# Patient Record
Sex: MALE | Race: BLACK OR AFRICAN AMERICAN | Employment: FULL TIME | URBAN - METROPOLITAN AREA
[De-identification: names, ages, dates, MRNs, and addresses within clinical notes are randomized per-mention and may not be internally consistent; named-entity substitution may affect disease eponyms.]

---

## 2020-12-10 ENCOUNTER — APPOINTMENT (OUTPATIENT)
Dept: GENERAL RADIOLOGY | Age: 70
End: 2020-12-10
Attending: EMERGENCY MEDICINE
Payer: OTHER MISCELLANEOUS

## 2020-12-10 ENCOUNTER — HOSPITAL ENCOUNTER (EMERGENCY)
Age: 70
Discharge: HOME | End: 2020-12-10
Attending: EMERGENCY MEDICINE
Payer: OTHER MISCELLANEOUS

## 2020-12-10 VITALS
SYSTOLIC BLOOD PRESSURE: 160 MMHG | OXYGEN SATURATION: 97 % | TEMPERATURE: 98.5 F | BODY MASS INDEX: 27 KG/M2 | RESPIRATION RATE: 16 BRPM | DIASTOLIC BLOOD PRESSURE: 72 MMHG | HEIGHT: 67 IN | WEIGHT: 172 LBS | HEART RATE: 64 BPM

## 2020-12-10 DIAGNOSIS — S82.891A CLOSED FRACTURE OF RIGHT ANKLE, INITIAL ENCOUNTER: Primary | ICD-10-CM

## 2020-12-10 PROCEDURE — 99284 EMERGENCY DEPT VISIT MOD MDM: CPT

## 2020-12-10 PROCEDURE — 75810000053 HC SPLINT APPLICATION

## 2020-12-10 PROCEDURE — 74011250637 HC RX REV CODE- 250/637: Performed by: PHYSICIAN ASSISTANT

## 2020-12-10 PROCEDURE — 73610 X-RAY EXAM OF ANKLE: CPT

## 2020-12-10 RX ORDER — HYDROCODONE BITARTRATE AND ACETAMINOPHEN 5; 325 MG/1; MG/1
1 TABLET ORAL
Qty: 15 TAB | Refills: 0 | Status: SHIPPED | OUTPATIENT
Start: 2020-12-10 | End: 2020-12-15

## 2020-12-10 RX ORDER — HYDROCODONE BITARTRATE AND ACETAMINOPHEN 5; 325 MG/1; MG/1
1 TABLET ORAL
Status: COMPLETED | OUTPATIENT
Start: 2020-12-10 | End: 2020-12-10

## 2020-12-10 RX ADMIN — HYDROCODONE BITARTRATE AND ACETAMINOPHEN 1 TABLET: 5; 325 TABLET ORAL at 12:27

## 2020-12-10 NOTE — ED TRIAGE NOTES
Pt to ER with EMS after he tripped and fell onto right ankle at work today. Swelling noted to ankle. Pt has a mask for triage.

## 2020-12-10 NOTE — LETTER
[id number] [address] EMERGENCY DEPT 
[address] [facility] [id number] [phone] Work/School Note Date: 12/10/2020 To Whom It May concern: 
 
Raffaele Client was seen and treated today in the emergency room by the following provider(s): 
Attending Provider: Sam Burton MD 
Physician Assistant: ERNESTO Lange. Raffaele Client may return to work when cleared by orthopedics. Sincerely, ERNESTO Mendes

## 2020-12-10 NOTE — DISCHARGE INSTRUCTIONS
Patient Education        Broken Ankle: Care Instructions  Your Care Instructions     An ankle may break (fracture) during sports, a fall, or other accidents. Fractures can range from a small, hairline crack, to a bone or bones broken into two or more pieces. Your treatment depends on how bad the break is. Your doctor may have put your ankle in a splint or cast to allow it to heal or to keep it stable until you see another doctor. It may take weeks or months for your ankle to heal. You can help your ankle heal with some care at home. You heal best when you take good care of yourself. Eat a variety of healthy foods, and don't smoke. You may have had a sedative to help you relax. You may be unsteady after having sedation. It can take a few hours for the medicine's effects to wear off. Common side effects of sedation include nausea, vomiting, and feeling sleepy or tired. The doctor has checked you carefully, but problems can develop later. If you notice any problems or new symptoms,  get medical treatment right away. Follow-up care is a key part of your treatment and safety. Be sure to make and go to all appointments, and call your doctor if you are having problems. It's also a good idea to know your test results and keep a list of the medicines you take. How can you care for yourself at home? · If the doctor gave you a sedative:  ? For 24 hours, don't do anything that requires attention to detail, such as going to work, making important decisions, or signing any legal documents. It takes time for the medicine's effects to completely wear off.  ? For your safety, do not drive or operate any machinery that could be dangerous. Wait until the medicine wears off and you can think clearly and react easily. · Put ice or a cold pack on your ankle for 10 to 20 minutes at a time. Try to do this every 1 to 2 hours for the next 3 days (when you are awake). Put a thin cloth between the ice and your cast or splint.  Keep your cast or splint dry. · Follow the cast care instructions your doctor gives you. If you have a splint, do not take it off unless your doctor tells you to. · Be safe with medicines. Take pain medicines exactly as directed. ? If the doctor gave you a prescription medicine for pain, take it as prescribed. ? If you are not taking a prescription pain medicine, ask your doctor if you can take an over-the-counter medicine. · Prop up your leg on pillows in the first few days after the injury. Keep the ankle higher than the level of your heart. This will help reduce swelling. · Do not put weight on your ankle unless your doctor tells you to. Use crutches to walk. · Follow instructions for exercises to keep your leg strong. · Wiggle your toes often to reduce swelling and stiffness. When should you call for help? Call 911 anytime you think you may need emergency care. For example, call if:    · You have chest pain, are short of breath, or you cough up blood.     · You are very sleepy and you have trouble waking up. Call your doctor now or seek immediate medical care if:    · You have new or worse nausea or vomiting.     · You have new or worse pain.     · Your foot is cool or pale or changes color.     · You have tingling, weakness, or numbness in your toes.     · Your cast or splint feels too tight.     · You have signs of a blood clot in your leg (called a deep vein thrombosis), such as:  ? Pain in your calf, back of the knee, thigh, or groin. ? Redness or swelling in your leg. Watch closely for changes in your health, and be sure to contact your doctor if:    · You have a problem with your splint or cast.     · You do not get better as expected. Where can you learn more? Go to http://www.gray.com/  Enter P763 in the search box to learn more about \"Broken Ankle: Care Instructions. \"  Current as of: March 2, 2020               Content Version: 12.6  © 7547-0735 Healthwise, Incorporated. Care instructions adapted under license by [employer] (which disclaims liability or warranty for this information). If you have questions about a medical condition or this instruction, always ask your healthcare professional. [address] any warranty or liability for your use of this information.

## 2020-12-10 NOTE — ED PROVIDER NOTES
States he twisted his right ankle at work he has pain to the right ankle inability to bear weight. Denies any other injury    The history is provided by the patient. Ankle Pain    This is a new problem. The current episode started less than 1 hour ago. The problem occurs constantly. The problem has not changed since onset. The pain is present in the right ankle. The quality of the pain is described as aching. The pain is at a severity of 8/10. The pain is moderate. Associated symptoms include stiffness. The symptoms are aggravated by movement and palpation. He has tried nothing for the symptoms. The treatment provided no relief. There has been a history of trauma. History reviewed. No pertinent past medical history. Past Surgical History:   Procedure Laterality Date    HX CHOLECYSTECTOMY           History reviewed. No pertinent family history.     Social History     Socioeconomic History    Marital status:      Spouse name: Not on file    Number of children: Not on file    Years of education: Not on file    Highest education level: Not on file   Occupational History    Not on file   Social Needs    Financial resource strain: Not on file    Food insecurity     Worry: Not on file     Inability: Not on file    Transportation needs     Medical: Not on file     Non-medical: Not on file   Tobacco Use    Smoking status: Never Smoker   Substance and Sexual Activity    Alcohol use: Not Currently    Drug use: Not Currently    Sexual activity: Not on file   Lifestyle    Physical activity     Days per week: Not on file     Minutes per session: Not on file    Stress: Not on file   Relationships    Social connections     Talks on phone: Not on file     Gets together: Not on file     Attends Zoroastrianism service: Not on file     Active member of club or organization: Not on file     Attends meetings of clubs or organizations: Not on file     Relationship status: Not on file    Intimate partner violence     Fear of current or ex partner: Not on file     Emotionally abused: Not on file     Physically abused: Not on file     Forced sexual activity: Not on file   Other Topics Concern    Not on file   Social History Narrative    Not on file         ALLERGIES: Shellfish derived    Review of Systems   Musculoskeletal: Positive for stiffness. All other systems reviewed and are negative. Vitals:    12/10/20 1129   BP: (!) 146/83   Pulse: 62   Resp: 16   Temp: 98.5 °F (36.9 °C)   SpO2: 98%   Weight: 78 kg (172 lb)   Height: 5' 7\" (1.702 m)            Physical Exam  Vitals signs and nursing note reviewed. Constitutional:       General: He is not in acute distress. Appearance: Normal appearance. He is well-developed and normal weight. He is not diaphoretic. HENT:      Head: Normocephalic and atraumatic. Eyes:      Pupils: Pupils are equal, round, and reactive to light. Neck:      Musculoskeletal: Normal range of motion and neck supple. Cardiovascular:      Rate and Rhythm: Normal rate and regular rhythm. Pulmonary:      Effort: Pulmonary effort is normal.      Breath sounds: Normal breath sounds. Abdominal:      General: Bowel sounds are normal.      Palpations: Abdomen is soft. Musculoskeletal: Normal range of motion. General: Swelling and tenderness present. Comments: Ankle with mild swelling both medial and lateral areas no abrasions. Tenderness to palpation over lateral malleolus. No midfoot pain no pain to the knee or hip   Skin:     General: Skin is warm. Neurological:      General: No focal deficit present. Mental Status: He is alert and oriented to person, place, and time. Psychiatric:         Mood and Affect: Mood normal.         Behavior: Behavior normal.          MDM  Number of Diagnoses or Management Options  Diagnosis management comments: XR ANKLE RT MIN 3 V   Final Result    IMPRESSION:    1.   Acute, mildly comminuted fracture of the distal fibula with widening of the    medial clear space and probable small avulsion of the medial malleolus. 2.  Small bony fragment projecting posteriorly to the distal tibia, which could    reflect a posterior tibial fracture. pt placed in  Splint and crutches, norco for pain, will call POA for follow up        Amount and/or Complexity of Data Reviewed  Clinical lab tests: ordered and reviewed  Review and summarize past medical records: yes    Risk of Complications, Morbidity, and/or Mortality  Presenting problems: moderate  Diagnostic procedures: moderate  Management options: moderate    Patient Progress  Patient progress: improved         SplintRenard    Date/Time: 12/10/2020 12:32 PM  Performed by: ERNESTO Gupta  Authorized by: ERNESTO Gupta     Consent:     Consent obtained:  Verbal    Consent given by:  Patient    Risks discussed:  Pain    Alternatives discussed:  Alternative treatment  Pre-procedure details:     Sensation:  Normal    Skin color:  Normal  Procedure details:     Laterality:  Right    Location:  Ankle    Ankle:  R ankle    Strapping: no      Splint type: Ankle stirrup    Supplies:  Elastic bandage, cotton padding and Ortho-Glass  Post-procedure details:     Pain:  Improved    Sensation:  Normal    Skin color:  Normal    Patient tolerance of procedure:   Tolerated well, no immediate complications

## 2020-12-10 NOTE — ED NOTES
I have reviewed discharge instructions with the patient. The patient verbalized understanding. Patient left ED via private vehicle. Discharge Method: ambulatory to Home with friend. Opportunity for questions and clarification provided. Patient given 1 scripts. To continue your aftercare when you leave the hospital, you may receive an automated call from our care team to check in on how you are doing. This is a free service and part of our promise to provide the best care and service to meet your aftercare needs.  If you have questions, or wish to unsubscribe from this service please call [phone]. Thank you for Choosing our [facility] Emergency Department.

## 2020-12-15 NOTE — H&P
Summary: Right Ankle Fracture. Unstable bimalleolar. Proceed to the operating room with open reduction internal fixation right lateral malleolus, medial malleolus and deltoid reconstruction. Pre op anesthesia block w/ Pop Saph. CC: Right ankle pain    HPI:  Patient presents with ankle pain after a re-days ago. They describe the pain as  810. It is a sharp jabbing pain with movement and a dull throbbing pain at rest.   Attempting to bear weight, moving the ankle, and direct pressure makes this pain worse. Not bearing weight, elevation and being still helps some. Went to an urgent care who placed into a splint. Follow-up with me to discuss surgical planning    ROS:  Standardized patient intake form was reviewed and signed by me. It covers 10 organ systems. [address]:  Past Medical, Family and Social history was obtained by standardized patient intake form. It was reviewed and signed by me  Recent A1C:     Allergies:????? Medications:Aspirin (325 MG, Take 1 tab once a day after surgery); Colace (100 MG, Take 1 tablet once a day); Percocet (5-325 MG, take 1 goldy 4-6 hours as needed for prn pain); Zofran (4 MG, take 1 tablet by mouth every 6 hours as needed n/v)    Tobacco: No  Diabetes: No    PE:  WDWN  patient in NAD, AAOx3, Appropriate mood and affect. . Patient ambulates with difficulty due to ankle pain. Uses assistive devices. Pulse:90     BUE: show  normal full motion of bilateral wrist, no thenar atrophy, normal  strength, hands are non TTP, with no skin lesions. No pathologic reflexes noted: negative Sullivan, negative inverted radial reflex. Negative Wartenberg sign   BLE: show no lymphadenopathy, normal monofilament, patellar tendon reflexes 3+. Left LE -- this is the unaffected side: FROM actively of toes, foot, ankle, knee and hip. No instability of foot or ankle with drawer and stress. 5/5 strength to TA/EHL/GSC/Peroneals/PTib. No skin lesions, Non TTP throughout.   There is no edema or ecchymosis. Right LE -- this is the affected side:  2+ DP. Toes wwp x5 w BCR. EHLFHLEDLFDL intact but no strength due to ankle pain. Unable to test movement and strength of foot and ankle due to pain. There is ecchymosis lateral and medially about the ankle. TTP at the medial and lateral sides of the ankle. They are not TTP at the proximal fibula.  + SILT to ssspdpt nerves. There are not any wounds or fracture blisters. XR: 3 view foot and ankle XR reviewed by me and shows the fibula fracture with translation of the ankle mortise. There is a large medial clear space. There is a small marquis off the distal malleolus indicating a bimalleolar ankle fracture. AssessmentDiagnosis: Right Ankle Fracture: Bimalleolar    Plan:  1- Weight Bearing non-  2-  Immobilization -CAM boot given today  3- Medication -preoperative medications given. I personally had a discussion about abuse potential of narcotics, how narcotics are only given for acute injuries or operative pain, and how no refills can nor will be given until the patients next appointment. They expressed understanding. 4- We had along discussion and decided to pursue non operative treatment. This is a for indication. We will proceed with open reduction internal fixation right bimalleolar ankle fracture with potential deltoid reconstruction. Operative Discussion: We discussed non operative and operative treatment options. With surgery we discussed infection, nerve damage, vascular insult, painful wounds, chronic edema, malunion, non union, hardware irritation, second surgery, and potential recurrent deformity. We also discussed how anesthesia has inherent risk that can result in respiratory issues, heart issues, nerve irritation and potentially death.

## 2020-12-15 NOTE — PROGRESS NOTES
Pt called in asking for \"meal assistance\" since his recent ER visit he has a Fx ankle. He asked if I could call his insurance company and provide information so he would be eligible for services. I called Arabella, pt does not qualify since her was not admitted. I informed pt and did provid him with info about [employer] #[phone], that was shared with me by [facility] . No further needs at this time.

## 2020-12-16 ENCOUNTER — ANESTHESIA EVENT (OUTPATIENT)
Dept: SURGERY | Age: 70
End: 2020-12-16
Payer: OTHER MISCELLANEOUS

## 2020-12-16 ENCOUNTER — HOSPITAL ENCOUNTER (OUTPATIENT)
Age: 70
Setting detail: OUTPATIENT SURGERY
Discharge: HOME | End: 2020-12-16
Attending: ORTHOPAEDIC SURGERY | Admitting: ORTHOPAEDIC SURGERY
Payer: OTHER MISCELLANEOUS

## 2020-12-16 ENCOUNTER — ANESTHESIA (OUTPATIENT)
Dept: SURGERY | Age: 70
End: 2020-12-16
Payer: OTHER MISCELLANEOUS

## 2020-12-16 ENCOUNTER — APPOINTMENT (OUTPATIENT)
Dept: GENERAL RADIOLOGY | Age: 70
End: 2020-12-16
Attending: ORTHOPAEDIC SURGERY
Payer: OTHER MISCELLANEOUS

## 2020-12-16 VITALS
TEMPERATURE: 98.7 F | BODY MASS INDEX: 25.84 KG/M2 | WEIGHT: 165 LBS | DIASTOLIC BLOOD PRESSURE: 67 MMHG | OXYGEN SATURATION: 98 % | SYSTOLIC BLOOD PRESSURE: 133 MMHG | HEART RATE: 65 BPM | RESPIRATION RATE: 16 BRPM

## 2020-12-16 PROCEDURE — 76210000020 HC REC RM PH II FIRST 0.5 HR: Performed by: ORTHOPAEDIC SURGERY

## 2020-12-16 PROCEDURE — 77030003602 HC NDL NRV BLK BBMI -B: Performed by: ANESTHESIOLOGY

## 2020-12-16 PROCEDURE — 77030002933 HC SUT MCRYL J&J -A: Performed by: ORTHOPAEDIC SURGERY

## 2020-12-16 PROCEDURE — 77030040116 HC KIT SURG BIT GD Q-FX DISP S&N -C: Performed by: ORTHOPAEDIC SURGERY

## 2020-12-16 PROCEDURE — C1713 ANCHOR/SCREW BN/BN,TIS/BN: HCPCS | Performed by: ORTHOPAEDIC SURGERY

## 2020-12-16 PROCEDURE — 74011250636 HC RX REV CODE- 250/636: Performed by: ORTHOPAEDIC SURGERY

## 2020-12-16 PROCEDURE — 2709999900 HC NON-CHARGEABLE SUPPLY: Performed by: ORTHOPAEDIC SURGERY

## 2020-12-16 PROCEDURE — 76210000063 HC OR PH I REC FIRST 0.5 HR: Performed by: ORTHOPAEDIC SURGERY

## 2020-12-16 PROCEDURE — 74011250636 HC RX REV CODE- 250/636: Performed by: NURSE ANESTHETIST, CERTIFIED REGISTERED

## 2020-12-16 PROCEDURE — 73600 X-RAY EXAM OF ANKLE: CPT

## 2020-12-16 PROCEDURE — 74011000250 HC RX REV CODE- 250: Performed by: NURSE ANESTHETIST, CERTIFIED REGISTERED

## 2020-12-16 PROCEDURE — 74011000250 HC RX REV CODE- 250: Performed by: ANESTHESIOLOGY

## 2020-12-16 PROCEDURE — 76060000034 HC ANESTHESIA 1.5 TO 2 HR: Performed by: ORTHOPAEDIC SURGERY

## 2020-12-16 PROCEDURE — 77030021807 HC PIN TEMP FIX WRGH -B: Performed by: ORTHOPAEDIC SURGERY

## 2020-12-16 PROCEDURE — 74011250637 HC RX REV CODE- 250/637: Performed by: ANESTHESIOLOGY

## 2020-12-16 PROCEDURE — 74011250636 HC RX REV CODE- 250/636: Performed by: ANESTHESIOLOGY

## 2020-12-16 PROCEDURE — 77030029732 HC BIT DRL ORTHOLOC 3DI WRGH -B: Performed by: ORTHOPAEDIC SURGERY

## 2020-12-16 PROCEDURE — 77030021122 HC SPLNT MAT FST BSNM -A: Performed by: ORTHOPAEDIC SURGERY

## 2020-12-16 PROCEDURE — 77030040922 HC BLNKT HYPOTHRM STRY -A: Performed by: ANESTHESIOLOGY

## 2020-12-16 PROCEDURE — 77030000032 HC CUF TRNQT ZIMM -B: Performed by: ORTHOPAEDIC SURGERY

## 2020-12-16 PROCEDURE — 76010000162 HC OR TIME 1.5 TO 2 HR INTENSV-TIER 1: Performed by: ORTHOPAEDIC SURGERY

## 2020-12-16 DEVICE — IMPLANTABLE DEVICE
Type: IMPLANTABLE DEVICE | Site: ANKLE | Status: FUNCTIONAL
Brand: ORTHOLOC

## 2020-12-16 DEVICE — IMPLANTABLE DEVICE
Type: IMPLANTABLE DEVICE | Site: ANKLE | Status: FUNCTIONAL
Brand: ORTHOLOC 3DI

## 2020-12-16 DEVICE — 1.8MM Q-FIX ALL SUTURE ANCHOR
Type: IMPLANTABLE DEVICE | Site: FOOT | Status: FUNCTIONAL
Brand: Q-FIX

## 2020-12-16 DEVICE — IMPLANTABLE DEVICE
Type: IMPLANTABLE DEVICE | Site: ANKLE | Status: FUNCTIONAL
Brand: ORTHOLOC 3DI PLATING SYSTEM

## 2020-12-16 DEVICE — CORTICAL SCREW
Type: IMPLANTABLE DEVICE | Site: ANKLE | Status: FUNCTIONAL
Brand: ORTHOLOC

## 2020-12-16 RX ORDER — ASPIRIN 325 MG
650 TABLET ORAL DAILY
COMMUNITY

## 2020-12-16 RX ORDER — BUPIVACAINE HYDROCHLORIDE AND EPINEPHRINE 5; 5 MG/ML; UG/ML
INJECTION, SOLUTION EPIDURAL; INTRACAUDAL; PERINEURAL
Status: COMPLETED | OUTPATIENT
Start: 2020-12-16 | End: 2020-12-16

## 2020-12-16 RX ORDER — EPHEDRINE SULFATE/0.9% NACL/PF 50 MG/5 ML
SYRINGE (ML) INTRAVENOUS AS NEEDED
Status: DISCONTINUED | OUTPATIENT
Start: 2020-12-16 | End: 2020-12-16 | Stop reason: HOSPADM

## 2020-12-16 RX ORDER — SODIUM CHLORIDE, SODIUM LACTATE, POTASSIUM CHLORIDE, CALCIUM CHLORIDE 600; 310; 30; 20 MG/100ML; MG/100ML; MG/100ML; MG/100ML
50 INJECTION, SOLUTION INTRAVENOUS CONTINUOUS
Status: DISCONTINUED | OUTPATIENT
Start: 2020-12-16 | End: 2020-12-16 | Stop reason: HOSPADM

## 2020-12-16 RX ORDER — HYDROMORPHONE HYDROCHLORIDE 2 MG/ML
0.5 INJECTION, SOLUTION INTRAMUSCULAR; INTRAVENOUS; SUBCUTANEOUS
Status: DISCONTINUED | OUTPATIENT
Start: 2020-12-16 | End: 2020-12-16 | Stop reason: HOSPADM

## 2020-12-16 RX ORDER — MIDAZOLAM HYDROCHLORIDE 1 MG/ML
2 INJECTION, SOLUTION INTRAMUSCULAR; INTRAVENOUS
Status: DISCONTINUED | OUTPATIENT
Start: 2020-12-16 | End: 2020-12-16 | Stop reason: HOSPADM

## 2020-12-16 RX ORDER — DEXAMETHASONE SODIUM PHOSPHATE 4 MG/ML
INJECTION, SOLUTION INTRA-ARTICULAR; INTRALESIONAL; INTRAMUSCULAR; INTRAVENOUS; SOFT TISSUE
Status: COMPLETED | OUTPATIENT
Start: 2020-12-16 | End: 2020-12-16

## 2020-12-16 RX ORDER — ACETAMINOPHEN 500 MG
1000 TABLET ORAL ONCE
Status: COMPLETED | OUTPATIENT
Start: 2020-12-16 | End: 2020-12-16

## 2020-12-16 RX ORDER — FENTANYL CITRATE 50 UG/ML
100 INJECTION, SOLUTION INTRAMUSCULAR; INTRAVENOUS ONCE
Status: COMPLETED | OUTPATIENT
Start: 2020-12-16 | End: 2020-12-16

## 2020-12-16 RX ORDER — PROPOFOL 10 MG/ML
INJECTION, EMULSION INTRAVENOUS
Status: DISCONTINUED | OUTPATIENT
Start: 2020-12-16 | End: 2020-12-16 | Stop reason: HOSPADM

## 2020-12-16 RX ORDER — ALBUTEROL SULFATE 0.83 MG/ML
2.5 SOLUTION RESPIRATORY (INHALATION) AS NEEDED
Status: DISCONTINUED | OUTPATIENT
Start: 2020-12-16 | End: 2020-12-16 | Stop reason: HOSPADM

## 2020-12-16 RX ORDER — SODIUM CHLORIDE, SODIUM LACTATE, POTASSIUM CHLORIDE, CALCIUM CHLORIDE 600; 310; 30; 20 MG/100ML; MG/100ML; MG/100ML; MG/100ML
75 INJECTION, SOLUTION INTRAVENOUS CONTINUOUS
Status: DISCONTINUED | OUTPATIENT
Start: 2020-12-16 | End: 2020-12-16 | Stop reason: HOSPADM

## 2020-12-16 RX ORDER — OXYCODONE HYDROCHLORIDE 5 MG/1
5 TABLET ORAL
Status: DISCONTINUED | OUTPATIENT
Start: 2020-12-16 | End: 2020-12-16 | Stop reason: HOSPADM

## 2020-12-16 RX ORDER — ONDANSETRON 2 MG/ML
INJECTION INTRAMUSCULAR; INTRAVENOUS AS NEEDED
Status: DISCONTINUED | OUTPATIENT
Start: 2020-12-16 | End: 2020-12-16 | Stop reason: HOSPADM

## 2020-12-16 RX ORDER — SODIUM CHLORIDE 0.9 % (FLUSH) 0.9 %
5-40 SYRINGE (ML) INJECTION EVERY 8 HOURS
Status: DISCONTINUED | OUTPATIENT
Start: 2020-12-16 | End: 2020-12-16 | Stop reason: HOSPADM

## 2020-12-16 RX ORDER — LIDOCAINE HYDROCHLORIDE 10 MG/ML
0.1 INJECTION INFILTRATION; PERINEURAL AS NEEDED
Status: DISCONTINUED | OUTPATIENT
Start: 2020-12-16 | End: 2020-12-16 | Stop reason: HOSPADM

## 2020-12-16 RX ORDER — LIDOCAINE HYDROCHLORIDE 20 MG/ML
INJECTION, SOLUTION EPIDURAL; INFILTRATION; INTRACAUDAL; PERINEURAL AS NEEDED
Status: DISCONTINUED | OUTPATIENT
Start: 2020-12-16 | End: 2020-12-16 | Stop reason: HOSPADM

## 2020-12-16 RX ORDER — MIDAZOLAM HYDROCHLORIDE 1 MG/ML
2 INJECTION, SOLUTION INTRAMUSCULAR; INTRAVENOUS ONCE
Status: COMPLETED | OUTPATIENT
Start: 2020-12-16 | End: 2020-12-16

## 2020-12-16 RX ORDER — PROPOFOL 10 MG/ML
INJECTION, EMULSION INTRAVENOUS AS NEEDED
Status: DISCONTINUED | OUTPATIENT
Start: 2020-12-16 | End: 2020-12-16 | Stop reason: HOSPADM

## 2020-12-16 RX ORDER — CEFAZOLIN SODIUM/WATER 2 G/20 ML
2 SYRINGE (ML) INTRAVENOUS ONCE
Status: COMPLETED | OUTPATIENT
Start: 2020-12-16 | End: 2020-12-16

## 2020-12-16 RX ORDER — SODIUM CHLORIDE 0.9 % (FLUSH) 0.9 %
5-40 SYRINGE (ML) INJECTION AS NEEDED
Status: DISCONTINUED | OUTPATIENT
Start: 2020-12-16 | End: 2020-12-16 | Stop reason: HOSPADM

## 2020-12-16 RX ADMIN — DEXAMETHASONE SODIUM PHOSPHATE 2 MG: 4 INJECTION, SOLUTION INTRAMUSCULAR; INTRAVENOUS at 11:49

## 2020-12-16 RX ADMIN — PROPOFOL 50 MG: 10 INJECTION, EMULSION INTRAVENOUS at 14:14

## 2020-12-16 RX ADMIN — ROPIVACAINE HYDROCHLORIDE 10 ML: 5 INJECTION, SOLUTION EPIDURAL; INFILTRATION; PERINEURAL at 11:49

## 2020-12-16 RX ADMIN — MIDAZOLAM 2 MG: 1 INJECTION INTRAMUSCULAR; INTRAVENOUS at 11:44

## 2020-12-16 RX ADMIN — SODIUM CHLORIDE, SODIUM LACTATE, POTASSIUM CHLORIDE, AND CALCIUM CHLORIDE 75 ML/HR: 600; 310; 30; 20 INJECTION, SOLUTION INTRAVENOUS at 11:41

## 2020-12-16 RX ADMIN — BUPIVACAINE HYDROCHLORIDE AND EPINEPHRINE BITARTRATE 20 ML: 5; .005 INJECTION, SOLUTION EPIDURAL; INTRACAUDAL; PERINEURAL at 11:47

## 2020-12-16 RX ADMIN — ROPIVACAINE HYDROCHLORIDE 10 ML: 5 INJECTION, SOLUTION EPIDURAL; INFILTRATION; PERINEURAL at 11:47

## 2020-12-16 RX ADMIN — FENTANYL CITRATE 50 MCG: 50 INJECTION, SOLUTION INTRAMUSCULAR; INTRAVENOUS at 11:44

## 2020-12-16 RX ADMIN — BUPIVACAINE HYDROCHLORIDE AND EPINEPHRINE BITARTRATE 5 ML: 5; .005 INJECTION, SOLUTION EPIDURAL; INTRACAUDAL; PERINEURAL at 11:49

## 2020-12-16 RX ADMIN — LIDOCAINE HYDROCHLORIDE 60 MG: 20 INJECTION, SOLUTION EPIDURAL; INFILTRATION; INTRACAUDAL; PERINEURAL at 14:13

## 2020-12-16 RX ADMIN — Medication 2 G: at 14:07

## 2020-12-16 RX ADMIN — ACETAMINOPHEN 1000 MG: 500 TABLET, FILM COATED ORAL at 11:41

## 2020-12-16 RX ADMIN — DEXAMETHASONE SODIUM PHOSPHATE 4 MG: 4 INJECTION, SOLUTION INTRAMUSCULAR; INTRAVENOUS at 15:08

## 2020-12-16 RX ADMIN — DEXAMETHASONE SODIUM PHOSPHATE 4 MG: 4 INJECTION, SOLUTION INTRAMUSCULAR; INTRAVENOUS at 11:47

## 2020-12-16 RX ADMIN — Medication 10 MG: at 15:08

## 2020-12-16 RX ADMIN — ONDANSETRON 4 MG: 2 INJECTION INTRAMUSCULAR; INTRAVENOUS at 15:08

## 2020-12-16 RX ADMIN — PROPOFOL 140 MCG/KG/MIN: 10 INJECTION, EMULSION INTRAVENOUS at 14:13

## 2020-12-16 NOTE — OP NOTES
Operative Note    Patient:Keshav Jang  MRN: [id number]    Date Of Surgery: 12/16/2020    Surgeon: Sami Lama MD    Assistant Surgeon: None    Procedure Performed:  right  ORIF Trimal Ankle Fracture without fixation of Posterior Lip CPT 70869  ORIF syndesmosis CPT 91660  Repair, primary, disrupted ligament, collateral (Deltoid repair) CPT 17428    Pre Op Diagnosis:  Right trimal ankle fracture    Post Op Diagnosis:   same    Implants:   Implant Name Type Inv. Item Serial No.  Lot No. LRB No. Used Action   ANCHOR SUT DIA1. 8MM FOR ACET LABRAL REP Q-FIX - [id number]  ANCHOR SUT DIA1. 8MM FOR ACET LABRAL REP Q-FIX [id number] [facility] [id number] Right 1 Implanted   PLATE BNE F62II R LAT FIB FOR ANK FRAC SYS ORTHOLOC 3DI - [id number]  PLATE BNE O06NJ R LAT FIB FOR ANK FRAC SYS ORTHOLOC 3DI  [employer] [id number] Right 1 Implanted   SCREW BNE L24MM DIA3. 5MM BRNZ HELENA TI ANK FULL THRD LAG - [id number]  SCREW BNE L24MM DIA3. 5MM BRNZ HELENA TI ANK FULL THRD LAG  [employer] [id number] Right 1 Implanted   SCR BNE LCK PLT 3DIA 3.5X16MM -- ORTHOLOC HALLUX - [id number]  SCR BNE LCK PLT 3DIA 3.5X16MM -- [address] [facility] [id number] Right 2 Implanted   SCR BNE LCK PLT 3DIA 3.5X14MM -- ORTHOLOC HALLUX - [id number]  SCR BNE LCK PLT 3DIA 3.5X14MM -- [id number] Right 1 Implanted   SCREW BNE L10MM DIA3.5MM HELENA TI POLYAX ERIC FULL THRD FOR - [id number]  SCREW BNE L10MM DIA3.5MM HELENA TI POLYAX ERIC FULL THRD FOR  [employer] [id number] Right 1 Implanted   SCREW BNE L14MM DIA3.5MM HELENA FT ANK TI NONLOCKING FULL - [id number]  SCREW BNE L14MM DIA3.5MM HELENA FT ANK TI NONLOCKING FULL  [employer] [id number] Right 2 Implanted   SCREW BNE L12MM DIA3.5MM HELENA TI POLYAX NONLOCKING FULL - [id number]  SCREW BNE L12MM DIA3.5MM HELENA TI POLYAX NONLOCKING FULL  [employer] [id number] Right 1 Implanted   SCREW BNE L54MM DIA3.5MM HELENA FULL 3DI PLATING S [facility] - [id number]  SCREW BNE L54MM DIA3.5MM HELENA FULL 3DI PLATING SYS ORTHOLOC  [employer] [id number] Right 1 Implanted       Anesthesia:  Regional    Blood Loss:  10cc    Tourniquet:  Estimated 71 minutes    Complications:  none    Pre Operative Abx:   Ancef 2g    Specimens/Cultures:  none    Significant Findings:  Fluoroscopic evaluation the operating room revealed a posterior malleolus fracture that was not seen on injury films. Therefore this was an ORIF trimalleolar ankle fracture without fixation of posterior lip. The medial malleolus fracture fragment was very small. It could not hold fixation. The medial malleolus fracture fragment that was excised. The remaining deltoid was incompetent so repair of it was needed. Stress views of the ankle revealed medial clear space widening and syndesmotic instability. Therefore the syndesmosis was stabilized    Pre Operative Course:  Ofelia Martin is a 78 y.o. male who this past weekend sustained a fall from standing. An obvious ankle injury was seen in outside facility. He then presented to my office where stress views revealed an unstable ankle fracture. Operation In Detail:  Patient was evaluated in the preoperative area. The right lower extremity was marked by me. We had a long discussion about the procedure and postoperative protocols. The patient was then brought back to the operating room suite and placed in the operating room table. A timeout was taken to identify the patient, procedure being performed, and laterality. After this the patient was prepped and draped in the normal sterile fashion using a Betadine solution and/or a ChloraPrep solution. A timeout was then taken to identify the patient his name, date of birth, laterality, and procedure being performed.   We also identified allergies and any concerns about the operation. Attention was then placed to the operative extremity. Patient was first placed over the medial malleolus. Incision extending from the distal anterior aspect of the medial malleolus over the deltoid was made. There is obvious giant rent in the deltoid ligament and the fibers were torn off of the insertion of the medial malleolus. The medial malleolus fracture was identified within the deltoid fibers that were torn. It was very small and was deemed too small to hold fixation. Therefore I excised the medial malleolus fracture fragment. I then paid attention to the deltoid ligament at his it was incompetent and ruptured. I debrided the torn edges and decided that I would repair it back to its insertion at the medial malleolus. I found the small fracture site off the medial malleolus, and I placed a [employer] Q fix anchor into this section. I then took each sure limb of the anchor and passed it through the deltoid ligament. One was a grasping suture and the other one was a nongrasping suture. I then cinched the deltoid ligament down while holding the ankle mortise reduced. This was checked in a fluoroscopy I felt that the medial shoulder was well reduced. This completed deltoid ligament repair. A lateral incision over the fibula was made with a 15 blade knife. The skin was incised, the superficial perineal nerve protected, and this dissection was taken down to the fracture site. The incision was carried distally down to the tip of the fibula. The peroneal tendons behind the fibula were protected. Then using a knife, rongeur, and curette we debrided the fracture site of any soft tissues and bone fragments. After adequate debridement of the fracture, we irrigated the fracture. Using reduction clamps and K wires the fracture was reduced. X Rays and direct visualization was used to insure fibular length, alignment and rotation.       This fixation began with the fracture still clamped. Using lag screw technique  1 2.7 lag screw was placed across the fracture site. The clamps were removed, and a distal fibula locking plate was applied to the fibula and held there with a clamp. Xray A-P and lateral were used to confirm good positioning. A drill was used to drill the fibula, the holes measure and an appropriate length screw placed. The plate was then secured to the bone with appropriate length screws in the same manner. The ankle was then given an external rotation and a Cotton syndesmosis test on XR. The syndesmosis was unstable. The syndesmosis was noted to be unstable. The decision for 1 screws was made. Using thumb reduction with visualization the syndesmosis was reduced. Under Xray we drilled for 1 screws; through the fibula plate, across the fibula, across the IOM and bicortically in the tibia. The appropriate length screw was placed bicortically. XR confirmed good hardware placement with syndesmosis reduction. Final x-rays were taken both AP and lateral and oblique views. The hardware was well placed and there is no signs of displacement of the fracture. I felt the alignment, lengthening, rotation of the fibula was good along with the reduction of the ankle mortise. I then performed another stress view with cotton and external rotation. The ankle mortise was stable and the syndesmosis was stable. I then copiously irrigated out both the medial and lateral wounds. I closed the subcutaneous wounds with a Monocryl suture and the skin with nylon sutures. A sterile dressing was then applied to the leg and Posterior slab splint with Stirrups. They were awoken from anesthesia and returned to the PACU without difficulty. Post Operative Plan:   1- WB status: NWB R Lower Extremity  2- Follow Up: 2-3 weeks  3- Immobilization/assistive devices: brace/splint  4- DVT px:  mg BID  5- Pain Medication: Percocet 5mg/325 q6 PRN pain #30  6- NWB x 2 weeks.  Follow up for stitch removal and wound check.

## 2020-12-16 NOTE — ANESTHESIA PROCEDURE NOTES
Peripheral Block    Start time: 12/16/2020 11:43 AM  End time: 12/16/2020 11:47 AM  Performed by: Sharmin Carson MD  Authorized by: Sharmin Carson MD       Pre-procedure: Indications: at surgeon's request and post-op pain management    Preanesthetic Checklist: patient identified, risks and benefits discussed, site marked, timeout performed, anesthesia consent given and patient being monitored    Timeout Time: 11:43          Block Type:   Block Type:  Popliteal  Laterality:  Right  Monitoring:  Responsive to questions, continuous pulse ox, oxygen, frequent vital sign checks and heart rate  Injection Technique:  Single shot  Procedures: ultrasound guided and nerve stimulator    Patient Position: supine  Prep: chlorhexidine    Location:  Lower thigh  Needle Type:  Stimuplex  Needle Gauge:  21 G  Needle Localization:  Ultrasound guidance and nerve stimulator  Motor Response comment:   Motor Response: minimal motor response >0.4 mA   Medication Injected:  Bupivacaine-EPINEPHrine (PF)(SENSORCAINE) 0.5%-1:200,000 mg injection, 20 mL  ropivacaine 0.5% with epinephrine 1:200,000 injection, 10 mL (Mixture components: EPINEPHrine HCl (PF) 1 mg/mL (1 mL) Soln, . 005 mL; ropivacaine (PF) 5 mg/mL (0.5 %) Soln, 1 mL)  dexamethasone (DECADRON) 4 mg/mL injection, 4 mg  Med Admin Time: 12/16/2020 11:47 AM    Assessment:  Number of attempts:  1  Injection Assessment:  Incremental injection every 5 mL, negative aspiration for CSF, no paresthesia, ultrasound image on chart, no intravascular symptoms, negative aspiration for blood and local visualized surrounding nerve on ultrasound  Patient tolerance:  Patient tolerated the procedure well with no immediate complications Statement Selected

## 2020-12-16 NOTE — ANESTHESIA PROCEDURE NOTES
Peripheral Block    Start time: 12/16/2020 11:47 AM  End time: 12/16/2020 11:49 AM  Performed by: Kamilah Law MD  Authorized by: Kamilah Law MD       Pre-procedure: Indications: at surgeon's request and post-op pain management    Preanesthetic Checklist: patient identified, risks and benefits discussed, site marked, timeout performed, anesthesia consent given and patient being monitored    Timeout Time: 11:47          Block Type:   Block Type: Adductor canal  Laterality:  Right  Monitoring:  Responsive to questions, continuous pulse ox, oxygen, frequent vital sign checks and heart rate  Injection Technique:  Single shot  Procedures: ultrasound guided    Patient Position: supine  Prep: chlorhexidine    Location:  Upper thigh  Needle Type:  Stimuplex  Needle Gauge:  21 G  Needle Localization:  Ultrasound guidance  Medication Injected:  Ropivacaine 0.5% with epinephrine 1:200,000 injection, 10 mL (Mixture components: EPINEPHrine HCl (PF) 1 mg/mL (1 mL) Soln, . 005 mL; ropivacaine (PF) 5 mg/mL (0.5 %) Soln, 1 mL)  bupivacaine-EPINEPHrine (PF)(SENSORCAINE) 0.5%-1:200,000 mg injection, 5 mL  dexamethasone (DECADRON) 4 mg/mL injection, 2 mg  Med Admin Time: 12/16/2020 11:49 AM    Assessment:  Number of attempts:  1  Injection Assessment:  Incremental injection every 5 mL, negative aspiration for CSF, no paresthesia, ultrasound image on chart, no intravascular symptoms, negative aspiration for blood and local visualized surrounding nerve on ultrasound  Patient tolerance:  Patient tolerated the procedure well with no immediate complications

## 2020-12-16 NOTE — PERIOP NOTES
Per wife Tatiana Poster, pt has follow up appointment, crutches and they are going by pharmacy to  prescribed pain medication

## 2020-12-16 NOTE — DISCHARGE INSTRUCTIONS
INSTRUCTIONS FOLLOWING FOOT SURGERY    ACTIVITY  Elevate foot. No Ice  Let the office know if dressing gets saturated with water . No weight bearing. Use crutches or knee walker until seen in follow up appointment  Don't put anything into the splint to relieve itching etc. Take one 325mg aspirin daily if okay with your medical doctor and you have no GI ulcer . Get up and out of bed frequently. While in bed move the legs as much as possible      DRESSING CARE Keep dry and in place until follow up appointment    CALL YOUR DOCTOR IF YOU HAVE  Excessive bleeding that does not stop after holding mild pressure over the area. Temperature of 101 degrees or above. Redness, excessive swelling or bruising, and/or green or yellow, smelly discharge from incision. Loss of sensation - cold, white or blue toes. DIET  Day of Surgery: Clear liquids until no nausea or vomiting; then light, bland diet (Baked chicken, plain rice, grits, scrambled egg, toast). Nothing Greasy, fried or spicy today  Advance to regular diet on second day, unless your doctor orders otherwise. PAIN  Take pain medications as directed by your doctor. Call your doctor if pain is NOT relieved by medication. PAIN MED SIDE EFFECTS  Constipation: Lots of fluids, try prune juice, then OTC stool softeners if necessary  Nausea: Take medication with food. AFTER ANESTHESIA  For the first 24 hours and while taking narcotics for pain: DO NOT Drive, Drink Alcoholic beverages, or make important Decisions. Be aware of dizziness following anesthesia and while taking pain medication. Preventing Infection at Home  We care about preventing infection and avoiding the spread of germs - not only when you are in the hospital but also when you return home. When you return home from the hospital, its important to take the following steps to help prevent infection and avoid spreading germs that could infect you and others.  Ask everyone in your home to follow these guidelines, too. Clean Your Hands  · Clean your hands whenever your hands are visibly dirty, before you eat, before or after touching your mouth, nose or eyes, and before preparing food. Clean them after contact with body fluids, using the restroom, touching animals or changing diapers. · When washing hands, wet them with warm water and work up a lather. Rub hands for at least 15 seconds, then rinse them and pat them dry with a clean towel or paper towel. · When using hand sanitizers, it should take about 15 seconds to rub your hands dry. If not, you probably didnt apply enough . Cover Your Sneeze or Cough  Germs are released into the air whenever you sneeze or cough. To prevent the spread of infection:  · Turn away from other people before coughing or sneezing. · Cover your mouth or nose with a tissue when you cough or sneeze. Put the tissue in the trash. · If you dont have a tissue, cough or sneeze into your upper sleeve, not your hands. · Always clean your hands after coughing or sneezing. Care for Wounds  Your skin is your bodys first line of defense against germs, but an open wound leaves an easy way for germs to enter your body. To prevent infection:  · Clean your hands before and after changing wound dressings, and wear gloves to change dressings if recommended by your doctor. · Take special care with IV lines or other devices inserted into the body. If you must touch them, clean your hands first.  · Follow any specific instructions from your doctor to care for your wounds. Contact your doctor if you experience any signs of infection, such as fever or increased redness at the surgical or wound site. Keep a Clean Home  · Clean or wipe commonly touched hard surfaces like door handles, sinks, tabletops, phones and TV remotes. · Use products labeled disinfectant to kill harmful bacteria and viruses. · Use a clean cloth or paper towel to clean and dry surfaces.  Wiping surfaces with a dirty dishcloth, sponge or towel will only spread germs. · Never share toothbrushes, russell, drinking glasses, utensils, razor blades, face cloths or bath towels to avoid spreading germs. · Be sure that the linens that you sleep on are clean. · Keep pets away from wounds and wash your hands after touching pets, their toys or bedding. We care about you and your health. Remember, preventing infections is a team effort between you, your family, friends and health care providers. DISCHARGE SUMMARY from Nurse    PATIENT INSTRUCTIONS:    After general anesthesia or intravenous sedation, for 24 hours or while taking prescription Narcotics:  · Limit your activities  · Do not drive and operate hazardous machinery  · Do not make important personal or business decisions  · Do  not drink alcoholic beverages  · If you have not urinated within 8 hours after discharge, please contact your surgeon on call. *  Please give a list of your current medications to your Primary Care Provider. *  Please update this list whenever your medications are discontinued, doses are      changed, or new medications (including over-the-counter products) are added. *  Please carry medication information at all times in case of emergency situations. These are general instructions for a healthy lifestyle:    No smoking/ No tobacco products/ Avoid exposure to second hand smoke    Surgeon General's Warning:  Quitting smoking now greatly reduces serious risk to your health.     Obesity, smoking, and sedentary lifestyle greatly increases your risk for illness    A healthy diet, regular physical exercise & weight monitoring are important for maintaining a healthy lifestyle    You may be retaining fluid if you have a history of heart failure or if you experience any of the following symptoms:  Weight gain of 3 pounds or more overnight or 5 pounds in a week, increased swelling in our hands or feet or shortness of breath while lying flat in bed. Please call your doctor as soon as you notice any of these symptoms; do not wait until your next office visit. Recognize signs and symptoms of STROKE:    F-face looks uneven    A-arms unable to move or move unevenly    S-speech slurred or non-existent    T-time-call 911 as soon as signs and symptoms begin-DO NOT go       Back to bed or wait to see if you get better-TIME IS BRAIN. Learning About How to Use Crutches  Your Care Instructions  Crutches can help you walk when you have an injured hip, leg, knee, ankle, or foot. Your doctor will tell you how much weight--if any--you can put on your leg. Be sure your crutches fit you. When you stand up in your normal posture, there should be space for two or three fingers between the top of the crutch and your armpit. When you let your hands hang down, the hand  should be at your wrists. When you put your hands on the hand , your elbows should be slightly bent. To stay safe when using crutches:  · Look straight ahead, not down at your feet. · Clear away small rugs, cords, or anything else that could cause you to trip, slip, or fall. · Be very careful around pets and small children. They can get in your path when you least expect it. · Be sure the rubber tips on your crutches are clean and in good condition to help prevent slipping. · Avoid slick conditions, such as wet floors and snowy or icy driveways. In bad weather, be especially careful on curbs and steps. How to use crutches  Getting ready to walk    1. Bend your elbows slightly. Press the padded top parts of the crutches against your sides, under your armpits. 2. If you have been told not to put any weight on your injured leg, keep that leg bent and off the ground. Walking with crutches    1. Put both crutches about 12 inches in front of you. 2. Put your weight on the handgrips, not on the pads under your arms.  (Constant pressure against your underarms can cause numbness.) Swing your body forward. (If you have been told not to put any weight on your injured leg, keep that leg bent and off the ground.)  3. To complete the step, put your weight on the strong leg. 4. Move your crutches about 12 inches in front of you, and start the next step. 5. When you're confident using the crutches, you can move the crutches and your injured leg at the same time. Then push straight down on the crutches as you step past the crutches with your strong leg, as you would in normal walking. 6. Take small steps. 7. Use ramps and elevators when you can. Sitting down    1. To sit, back up to the chair. Use one hand to hold both crutches by the handgrips, beside your injured leg. With the other hand, hold onto the seat and slowly lower yourself onto the chair. 2. Lay the crutches on the ground near your chair. If you prop them up, they may fall over. Getting up from a chair    1. To get up from a chair,  the crutches and put them in one hand beside your injured leg. 2. Put your weight on the handgrips of the crutches and on your strong leg to stand up. Walking up stairs    1. To go up stairs, step up with your strong leg and then bring the crutches and your injured leg to the upper step. 2. For stairs that have handrails: Put both crutches under the arm opposite the handrail. Use the hand opposite the handrail to hold both crutches by the handgrips. 3. Hold onto the handrail as you go up. Put your strong leg on the step first when you go up. Walking down stairs    1. To go down stairs, put your crutches and injured leg on the lower step. 2. Bring your strong leg to the lower step. This saying may help you remember: \"Up with the good, down with the bad. \"  3. For stairs that have handrails: Put both crutches under the arm opposite the handrail. Use the hand opposite the handrail to hold both crutches by the handgrips. Hold onto the handrail as you go down.  Follow the same process you use for stairs: Lead with your crutches and injured leg on the way down. Follow-up care is a key part of your treatment and safety. Be sure to make and go to all appointments, and call your doctor if you are having problems. It's also a good idea to know your test results and keep a list of the medicines you take. Where can you learn more? Go to http://www.gray.com/. Enter P263 in the search box to learn more about \"Learning About How to Use Crutches. \"  Current as of: August 4, 2016  Content Version: 11.2  © 1723-4103 [employer]. Care instructions adapted under license by [employer] (which disclaims liability or warranty for this information). If you have questions about a medical condition or this instruction, always ask your healthcare professional. [address] any warranty or liability for your use of this information. Learning About Coronavirus [phone])  Coronavirus [phone]): Overview  What is coronavirus (COVID-19)? The coronavirus disease (COVID-19) is caused by a virus. It is an illness that was first found in [place], [place], in December 2019. It has since spread worldwide. The virus can cause fever, cough, and trouble breathing. In severe cases, it can cause pneumonia and make it hard to breathe without help. It can cause death. Coronaviruses are a large group of viruses. They cause the common cold. They also cause more serious illnesses like [place] respiratory syndrome (MERS) and severe acute respiratory syndrome (SARS). COVID-19 is caused by a novel coronavirus. That means it's a new type that has not been seen in people before. This virus spreads person-to-person through droplets from coughing and sneezing. It can also spread when you are close to someone who is infected. And it can spread when you touch something that has the virus on it, such as a doorknob or a tabletop.   What can you do to protect yourself from coronavirus (COVID-19)? The best way to protect yourself from getting sick is to:  · Avoid areas where there is an outbreak. · Avoid contact with people who may be infected. · Wash your hands often with soap or alcohol-based hand sanitizers. · Avoid crowds and try to stay at least 6 feet away from other people. · Wash your hands often, especially after you cough or sneeze. Use soap and water, and scrub for at least 20 seconds. If soap and water aren't available, use an alcohol-based hand . · Avoid touching your mouth, nose, and eyes. What can you do to avoid spreading the virus to others? To help avoid spreading the virus to others:  · Cover your mouth with a tissue when you cough or sneeze. Then throw the tissue in the trash. · Use a disinfectant to clean things that you touch often. · Wear a cloth face cover if you have to go to public areas. · Stay home if you are sick or have been exposed to the virus. Don't go to school, work, or public areas. And don't use public transportation, ride-shares, or taxis unless you have no choice. · If you are sick:  ? Leave your home only if you need to get medical care. But call the doctor's office first so they know you're coming. And wear a face cover. ? Wear the face cover whenever you're around other people. It can help stop the spread of the virus when you cough or sneeze. ? Clean and disinfect your home every day. Use household  and disinfectant wipes or sprays. Take special care to clean things that you grab with your hands. These include doorknobs, remote controls, phones, and handles on your refrigerator and microwave. And don't forget countertops, tabletops, bathrooms, and computer keyboards. When to call for help  [id number] anytime you think you may need emergency care. For example, call if:  · You have severe trouble breathing. (You can't talk at all.)  · You have constant chest pain or pressure.   · You are severely dizzy or lightheaded. · You are confused or can't think clearly. · Your face and lips have a blue color. · You pass out (lose consciousness) or are very hard to wake up. Call your doctor now if you develop symptoms such as:  · Shortness of breath. · Fever. · Cough. If you need to get care, call ahead to the doctor's office for instructions before you go. Make sure you wear a face cover to prevent exposing other people to the virus. Where can you get the latest information? The following health organizations are tracking and studying this virus. Their websites contain the most up-to-date information. Sandrita Ortiz also learn what to do if you think you may have been exposed to the virus. · U.S. Centers for Disease Control and Prevention (CDC): The CDC provides updated news about the disease and travel advice. The website also tells you how to prevent the spread of infection. www.cdc.gov  · World Health Organization [facility]): WHO offers information about the virus outbreaks. WHO also has travel advice. www.who.int  Current as of: May 8, 2020               Content Version: 12.5  © 9096-8479 [employer]. Care instructions adapted under license by [employer] (which disclaims liability or warranty for this information). If you have questions about a medical condition or this instruction, always ask your healthcare professional. [address] any warranty or liability for your use of this information. Learning About a Peripheral Nerve Block  What is a peripheral nerve block? For some surgeries, a doctor may do a peripheral nerve block to numb the part of the body involved, often an arm or a leg. Peripheral nerves lead from the spinal cord to other parts of the body, carrying signals for movement and feeling. The nerve block is sometimes used with medicine that makes you sleep during the surgery.  Or the nerve block may be all that's needed, and you can stay awake without feeling any pain. The nerve block may also help keep your pain level lower after the surgery. How is a peripheral nerve block done? Before the nerve block, you will have a tube called an IV placed in your arm. This will be used to give you medicine to make you sleep or keep you comfortable. The doctor may use ultrasound, X-ray, or another imaging method to help guide the needle that will be used for the nerve block. After finding the right spot, the doctor uses a tiny needle to numb the skin. Then he or she puts the nerve block needle into the numbed area. If you are awake, you may feel some pressure. But you should not feel pain. What can you expect after a peripheral nerve block? The nerve block will leave the area that was numbed, like your arm or leg, partly or totally numb for a while. Your doctor will tell you for how long. Follow your doctor's instructions closely. If you'll be going home right after the surgery, you'll need someone to drive you. As the block wears off, you'll start to feel some pain from the surgery. Be sure to take your pain medicines before the pain gets bad. What are the risks of a peripheral nerve block? You will be closely watched during the nerve block. That's because the anesthetics used for the nerve block may affect the central nervous system, cardiovascular system, and respiratory system (airway and lungs). They may also affect blood pressure, breathing, heartbeat, and other vital functions. As the needle is put in place during the nerve block, the nerve to be blocked may be touched with the tip of the needle. If this happens, you may feel a sharp sensation like an electrical shock in the part of the body supplied by the nerve. Be sure to let your doctor know if you feel this. Problems after a nerve block are rare. There is a small risk of seizures, heart problems, damage to nerves, infection, or bleeding. The benefits usually outweigh these risks.   Follow-up care is a key part of your treatment and safety. Be sure to make and go to all appointments, and call your doctor if you are having problems. It's also a good idea to know your test results and keep a list of the medicines you take. Where can you learn more? Go to http://www.gray.com/  Enter A594 in the search box to learn more about \"Learning About a Peripheral Nerve Block. \"  Current as of: November 20, 2019               Content Version: 12.6  © 1875-7920 [employer]. Care instructions adapted under license by [employer] (which disclaims liability or warranty for this information). If you have questions about a medical condition or this instruction, always ask your healthcare professional. [place] [address] any warranty or liability for your use of this information. Hydrocodone/Acetaminophen (Vicodin, Norco, Lortab) - (By mouth)   Why this medicine is used:   Treats pain. Contact a nurse or doctor right away if you have:  · Blistering, peeling, red skin rash  · Fast or slow heartbeat, shallow breathing, blue lips, fingernails, or skin  · Anxiety, restlessness, muscle spasms, twitching, seeing or hearing things that are not there  · Dark urine or pale stools, yellow skin or eyes  · Extreme weakness, sweating, seizures, cold or clammy skin  · Lightheadedness, dizziness, fainting, fever, sweating     Common side effects:  · Constipation, nausea, vomiting, loss of appetite, stomach pain  · Tiredness or sleepiness  © 2017 [facility] Information is for End User's use only and may not be sold, redistributed or otherwise used for commercial purposes.

## 2020-12-16 NOTE — ANESTHESIA PREPROCEDURE EVALUATION
Relevant Problems   No relevant active problems       Anesthetic History   No history of anesthetic complications            Review of Systems / Medical History  Patient summary reviewed, nursing notes reviewed and pertinent labs reviewed    Pulmonary  Within defined limits                 Neuro/Psych   Within defined limits           Cardiovascular  Within defined limits                Exercise tolerance: >4 METS     GI/Hepatic/Renal  Within defined limits              Endo/Other  Within defined limits           Other Findings              Physical Exam    Airway  Mallampati: II           Cardiovascular  Regular rate and rhythm,  S1 and S2 normal,  no murmur, click, rub, or gallop             Dental  No notable dental hx       Pulmonary  Breath sounds clear to auscultation               Abdominal         Other Findings            Anesthetic Plan    ASA: 1  Anesthesia type: total IV anesthesia - femoral single shot and popliteal fossa block      Post-op pain plan if not by surgeon: peripheral nerve block single    Induction: Intravenous  Anesthetic plan and risks discussed with: Patient

## 2020-12-16 NOTE — ANESTHESIA POSTPROCEDURE EVALUATION
Procedure(s):  RIGHT LATERAL MALLEOLUS  OPEN REDUCTION INTERNAL FIXATION  RIGHT DELTOID ANKLE RECONSTRUCTION .     total IV anesthesia    Anesthesia Post Evaluation      Multimodal analgesia: multimodal analgesia used between 6 hours prior to anesthesia start to PACU discharge  Patient location during evaluation: PACU  Patient participation: complete - patient participated  Level of consciousness: awake and alert  Pain management: adequate  Airway patency: patent  Anesthetic complications: no  Cardiovascular status: acceptable  Respiratory status: acceptable  Hydration status: acceptable  Post anesthesia nausea and vomiting:  none  Final Post Anesthesia Temperature Assessment:  Normothermia (36.0-37.5 degrees C)      INITIAL Post-op Vital signs:   Vitals Value Taken Time   /67 12/16/20 1619   Temp 37.1 °C (98.7 °F) 12/16/20 1552   Pulse 63 12/16/20 1619   Resp 16 12/16/20 1619   SpO2 96 % 12/16/20 1619

## 2020-12-16 NOTE — INTERVAL H&P NOTE
Update History & Physical    The Patient's History and Physical was reviewed   I discussed the surgery and patients medical condition with the patient. The chart was reviewed with the patient and I examined the patient. There was no change. The surgical site was confirmed by the patient and me. CV: RRR  RESP: CTAB    Plan:  The risk, benefits, expected outcome, and alternative to the recommended procedure have been discussed with the patient. Patient understands and wants to proceed with the procedure.     Electronically signed by Minh Oscar MD on 10/19/2020 at 10:10 AM

## 2020-12-17 NOTE — ADDENDUM NOTE
Addendum  created 12/17/20 0724 by Antonio Tam CRNA    Flowsheet accepted, Intraprocedure Flowsheets edited

## 2021-03-29 NOTE — INTERVAL H&P NOTE
Pt returning nurse call    Update History & Physical    The Patient's History and Physical was reviewed   I discussed the surgery and patients medical condition with the patient. The chart was reviewed with the patient and I examined the patient. There was no change. The surgical site was confirmed by the patient and me. CV: RRR  RESP: CTAB    Plan:  The risk, benefits, expected outcome, and alternative to the recommended procedure have been discussed with the patient. Patient understands and wants to proceed with the procedure.     Electronically signed by Dimple Strong MD on 10/19/2020 at 10:10 AM

## 2023-10-23 ENCOUNTER — HOSPITAL ENCOUNTER (EMERGENCY)
Age: 73
Discharge: HOME | End: 2023-10-23
Payer: COMMERCIAL

## 2023-10-23 ENCOUNTER — APPOINTMENT (OUTPATIENT)
Dept: CT IMAGING | Age: 73
End: 2023-10-23
Payer: COMMERCIAL

## 2023-10-23 VITALS
HEIGHT: 66 IN | TEMPERATURE: 98.3 F | BODY MASS INDEX: 27.16 KG/M2 | WEIGHT: 169 LBS | RESPIRATION RATE: 18 BRPM | SYSTOLIC BLOOD PRESSURE: 135 MMHG | HEART RATE: 63 BPM | OXYGEN SATURATION: 98 % | DIASTOLIC BLOOD PRESSURE: 66 MMHG

## 2023-10-23 DIAGNOSIS — S39.94XA INJURY TO PENIS, INITIAL ENCOUNTER: Primary | ICD-10-CM

## 2023-10-23 LAB
ANION GAP SERPL CALC-SCNC: 5 MMOL/L (ref 2–11)
BASOPHILS # BLD: 0 K/UL (ref 0–0.2)
BASOPHILS NFR BLD: 0 % (ref 0–2)
BUN SERPL-MCNC: 19 MG/DL (ref 8–23)
CALCIUM SERPL-MCNC: 9.3 MG/DL (ref 8.3–10.4)
CHLORIDE SERPL-SCNC: 112 MMOL/L (ref 101–110)
CO2 SERPL-SCNC: 28 MMOL/L (ref 21–32)
CREAT SERPL-MCNC: 1.32 MG/DL (ref 0.8–1.5)
DIFFERENTIAL METHOD BLD: ABNORMAL
EOSINOPHIL # BLD: 0 K/UL (ref 0–0.8)
EOSINOPHIL NFR BLD: 0 % (ref 0.5–7.8)
ERYTHROCYTE [DISTWIDTH] IN BLOOD BY AUTOMATED COUNT: 13.6 % (ref 11.9–14.6)
GLUCOSE SERPL-MCNC: 118 MG/DL (ref 65–100)
HCT VFR BLD AUTO: 40 % (ref 41.1–50.3)
HGB BLD-MCNC: 13.4 G/DL (ref 13.6–17.2)
IMM GRANULOCYTES # BLD AUTO: 0 K/UL (ref 0–0.5)
IMM GRANULOCYTES NFR BLD AUTO: 0 % (ref 0–5)
LYMPHOCYTES # BLD: 0.8 K/UL (ref 0.5–4.6)
LYMPHOCYTES NFR BLD: 7 % (ref 13–44)
MCH RBC QN AUTO: 27.5 PG (ref 26.1–32.9)
MCHC RBC AUTO-ENTMCNC: 33.5 G/DL (ref 31.4–35)
MCV RBC AUTO: 82 FL (ref 82–102)
MONOCYTES # BLD: 0.6 K/UL (ref 0.1–1.3)
MONOCYTES NFR BLD: 6 % (ref 4–12)
NEUTS SEG # BLD: 9.2 K/UL (ref 1.7–8.2)
NEUTS SEG NFR BLD: 86 % (ref 43–78)
NRBC # BLD: 0 K/UL (ref 0–0.2)
PLATELET # BLD AUTO: 155 K/UL (ref 150–450)
PMV BLD AUTO: 10.4 FL (ref 9.4–12.3)
POTASSIUM SERPL-SCNC: 3.6 MMOL/L (ref 3.5–5.1)
RBC # BLD AUTO: 4.88 M/UL (ref 4.23–5.6)
SODIUM SERPL-SCNC: 145 MMOL/L (ref 133–143)
WBC # BLD AUTO: 10.7 K/UL (ref 4.3–11.1)

## 2023-10-23 PROCEDURE — 6360000004 HC RX CONTRAST MEDICATION

## 2023-10-23 PROCEDURE — 72193 CT PELVIS W/DYE: CPT

## 2023-10-23 PROCEDURE — 85025 COMPLETE CBC W/AUTO DIFF WBC: CPT

## 2023-10-23 PROCEDURE — 99285 EMERGENCY DEPT VISIT HI MDM: CPT

## 2023-10-23 PROCEDURE — 80048 BASIC METABOLIC PNL TOTAL CA: CPT

## 2023-10-23 RX ADMIN — IOPAMIDOL 100 ML: 755 INJECTION, SOLUTION INTRAVENOUS at 17:56

## 2023-10-23 ASSESSMENT — ENCOUNTER SYMPTOMS
DIARRHEA: 0
CHEST TIGHTNESS: 0
VOMITING: 0
ABDOMINAL PAIN: 0
WHEEZING: 0
NAUSEA: 0
COLOR CHANGE: 0
SHORTNESS OF BREATH: 0

## 2023-10-23 ASSESSMENT — PAIN - FUNCTIONAL ASSESSMENT: PAIN_FUNCTIONAL_ASSESSMENT: 0-10

## 2023-10-23 ASSESSMENT — PAIN DESCRIPTION - LOCATION: LOCATION: GROIN

## 2023-10-23 ASSESSMENT — PAIN SCALES - GENERAL: PAINLEVEL_OUTOF10: 6
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
## 2023-10-24 ENCOUNTER — TELEPHONE (OUTPATIENT)
Dept: UROLOGY | Age: 73
End: 2023-10-24

## 2023-10-24 ENCOUNTER — HOSPITAL ENCOUNTER (EMERGENCY)
Age: 73
Discharge: HOME | End: 2023-10-24

## 2023-10-24 VITALS
OXYGEN SATURATION: 98 % | RESPIRATION RATE: 18 BRPM | DIASTOLIC BLOOD PRESSURE: 73 MMHG | WEIGHT: 169 LBS | BODY MASS INDEX: 27.16 KG/M2 | HEIGHT: 66 IN | TEMPERATURE: 98.6 F | HEART RATE: 69 BPM | SYSTOLIC BLOOD PRESSURE: 119 MMHG

## 2023-10-24 ASSESSMENT — PAIN - FUNCTIONAL ASSESSMENT: PAIN_FUNCTIONAL_ASSESSMENT: 0-10

## 2023-10-24 ASSESSMENT — PAIN SCALES - GENERAL: PAINLEVEL_OUTOF10: 4

## 2023-10-24 NOTE — DISCHARGE INSTRUCTIONS
CT reassuring. Discussed case with the on-call urologist.  Their office will call to schedule an appointment. In the interim, recommend applying pressure and ice to the wound to improve swelling and bruising. Please return with any worsening symptoms such as inability to urinate, worsening bleeding or pain.

## 2023-10-27 ENCOUNTER — OFFICE VISIT (OUTPATIENT)
Dept: UROLOGY | Age: 73
End: 2023-10-27

## 2023-10-27 DIAGNOSIS — S39.94XA INJURY TO PENIS, INITIAL ENCOUNTER: Primary | ICD-10-CM

## 2023-10-27 LAB
BILIRUBIN, URINE, POC: NEGATIVE
BLOOD URINE, POC: NORMAL
GLUCOSE URINE, POC: NEGATIVE
KETONES, URINE, POC: NEGATIVE
LEUKOCYTE ESTERASE, URINE, POC: NEGATIVE
NITRITE, URINE, POC: NEGATIVE
PH, URINE, POC: 6 (ref 4.6–8)
PROTEIN,URINE, POC: NEGATIVE
PVR, POC: 29 CC
SPECIFIC GRAVITY, URINE, POC: 1.01 (ref 1–1.03)
URINALYSIS CLARITY, POC: NORMAL
URINALYSIS COLOR, POC: NORMAL
UROBILINOGEN, POC: NORMAL

## 2023-10-27 ASSESSMENT — ENCOUNTER SYMPTOMS
CONSTIPATION: 0
EYE DISCHARGE: 0
VOMITING: 0
EYE PAIN: 0
BACK PAIN: 0
SKIN LESIONS: 0
INDIGESTION: 0
BLOOD IN STOOL: 1
WHEEZING: 0
DIARRHEA: 0
SHORTNESS OF BREATH: 0
HEARTBURN: 0
COUGH: 0
ABDOMINAL PAIN: 0
NAUSEA: 0

## 2023-11-19 ENCOUNTER — HOSPITAL ENCOUNTER (EMERGENCY)
Age: 73
Discharge: HOME | End: 2023-11-19
Attending: EMERGENCY MEDICINE
Payer: COMMERCIAL

## 2023-11-19 ENCOUNTER — APPOINTMENT (OUTPATIENT)
Dept: CT IMAGING | Age: 73
End: 2023-11-19
Payer: COMMERCIAL

## 2023-11-19 VITALS
DIASTOLIC BLOOD PRESSURE: 63 MMHG | WEIGHT: 169 LBS | TEMPERATURE: 99.2 F | OXYGEN SATURATION: 98 % | RESPIRATION RATE: 16 BRPM | BODY MASS INDEX: 27.16 KG/M2 | SYSTOLIC BLOOD PRESSURE: 130 MMHG | HEIGHT: 66 IN | HEART RATE: 60 BPM
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
## 2023-11-19 DIAGNOSIS — M79.18 MUSCULOSKELETAL PAIN: Primary | ICD-10-CM

## 2023-11-19 PROCEDURE — 99284 EMERGENCY DEPT VISIT MOD MDM: CPT

## 2023-11-19 PROCEDURE — 72125 CT NECK SPINE W/O DYE: CPT

## 2023-11-19 RX ORDER — METHOCARBAMOL 500 MG/1
500-1500 TABLET, FILM COATED ORAL 3 TIMES DAILY
Qty: 45 TABLET | Refills: 0 | Status: SHIPPED | OUTPATIENT
Start: 2023-11-19 | End: 2023-11-24

## 2023-11-19 ASSESSMENT — PAIN DESCRIPTION - LOCATION: LOCATION: NECK

## 2023-11-19 ASSESSMENT — PAIN - FUNCTIONAL ASSESSMENT: PAIN_FUNCTIONAL_ASSESSMENT: 0-10

## 2023-11-19 ASSESSMENT — PAIN SCALES - GENERAL: PAINLEVEL_OUTOF10: 5

## 2023-11-20 NOTE — DISCHARGE INSTRUCTIONS
You will likely have worsening pain and stiffness over the next 2 to 3 days. Being up walking around and being active can help reduce your symptoms and speed up your recovery. However, it may still take 2 to 3 weeks before you are back to normal.    Please return with any confusion, vomiting, difficulty breathing, or additional concerns. Follow-up with a primary care doctor for reevaluation as needed.
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
## 2024-01-29 ENCOUNTER — OFFICE VISIT (OUTPATIENT)
Dept: UROLOGY | Age: 74
End: 2024-01-29
Payer: MEDICARE

## 2024-01-29 DIAGNOSIS — S39.94XA INJURY TO PENIS, INITIAL ENCOUNTER: Primary | ICD-10-CM

## 2024-01-29 PROCEDURE — 1123F ACP DISCUSS/DSCN MKR DOCD: CPT | Performed by: NURSE PRACTITIONER

## 2024-01-29 PROCEDURE — 99213 OFFICE O/P EST LOW 20 MIN: CPT | Performed by: NURSE PRACTITIONER

## 2024-01-29 ASSESSMENT — ENCOUNTER SYMPTOMS
NAUSEA: 0
BACK PAIN: 0

## 2024-01-29 NOTE — PROGRESS NOTES
file   Occupational History    Not on file   Tobacco Use    Smoking status: Never    Smokeless tobacco: Never   Substance and Sexual Activity    Alcohol use: Not Currently    Drug use: Not Currently    Sexual activity: Not on file   Other Topics Concern    Not on file   Social History Narrative    Not on file     Social Determinants of Health     Financial Resource Strain: Not on file   Food Insecurity: Not on file   Transportation Needs: Not on file   Physical Activity: Not on file   Stress: Not on file   Social Connections: Not on file   Intimate Partner Violence: Not on file   Housing Stability: Not on file     Family History   Problem Relation Age of Onset    No Known Problems Mother     Breast Cancer Sister     No Known Problems Brother     No Known Problems Brother        Review of Systems  Constitutional:   Negative for fever.  GI:  Negative for nausea.  Musculoskeletal:  Negative for back pain.      Urinalysis  UA - Dipstick  Results for orders placed or performed in visit on 10/27/23   AMB POC URINALYSIS DIP STICK AUTO W/O MICRO   Result Value Ref Range    Color (UA POC)      Clarity (UA POC)      Glucose, Urine, POC Negative Negative    Bilirubin, Urine, POC Negative Negative    KETONES, Urine, POC Negative Negative    Specific Gravity, Urine, POC 1.015 1.001 - 1.035    Blood (UA POC) Trace-intact Negative    pH, Urine, POC 6.0 4.6 - 8.0    Protein, Urine, POC Negative Negative    Urobilinogen, POC 0.2 mg/dL     Nitrite, Urine, POC Negative Negative    Leukocyte Esterase, Urine, POC Negative Negative     PHYSICAL EXAM    GENERAL: No acute distress, Awake, Alert, Oriented X 3, Gait normal  ABDOMEN: soft, non tender, non-distended, positive bowel sounds, no organomegaly, no palpable masses, no guarding, no rebound tenderness  SKIN: No rash, no erythema, no lacerations or abrasions, no ecchymosis  MUSCULOSKELETAL - MAEW, no edema       Assessment and Plan    ICD-10-CM    1. Injury to penis, initial encounter

## (undated) DEVICE — ZIMMER® STERILE DISPOSABLE TOURNIQUET CUFF WITH PLC, DUAL PORT, SINGLE BLADDER, 30 IN. (76 CM)

## (undated) DEVICE — DISPOSABLE KIT FOR 1.8 MM Q-FIX                                    IMPLANT, INCLUDES DRILL, DRILL GUIDE                                    AND OBTURATOR: Brand: Q-FIX

## (undated) DEVICE — SUTURE MCRYL SZ 3-0 L27IN ABSRB UD L24MM PS-1 3/8 CIR PRIM Y936H

## (undated) DEVICE — DRAPE XR C ARM 41X74IN LF --

## (undated) DEVICE — AMD ANTIMICROBIAL GAUZE SPONGES,12 PLY USP TYPE VII, 0.2% POLYHEXAMETHYLENE BIGUANIDE HCI (PHMB): Brand: CURITY

## (undated) DEVICE — SUTURE MCRYL SZ 3-0 L27IN ABSRB UD L19MM PS-2 3/8 CIR PRIM Y427H

## (undated) DEVICE — BNDG,ELSTC,MATRIX,STRL,6"X5YD,LF,HOOK&LP: Brand: MEDLINE

## (undated) DEVICE — DRILL BIT

## (undated) DEVICE — PADDING CAST W2INXL4YD ST COT COHESIVE HND TEARABLE SPEC

## (undated) DEVICE — DRAPE SHT 3 QTR PROXIMA 53X77 --

## (undated) DEVICE — REM POLYHESIVE ADULT PATIENT RETURN ELECTRODE: Brand: VALLEYLAB

## (undated) DEVICE — SPLINT MAT XF SPEC 5X30IN --

## (undated) DEVICE — PAD,ABDOMINAL,5"X9",ST,LF,25/BX: Brand: MEDLINE INDUSTRIES, INC.

## (undated) DEVICE — BANDAGE,ELASTIC,ESMARK,STERILE,4"X9',LF: Brand: MEDLINE

## (undated) DEVICE — DRSG GZ OIL EMUL CURAD 3X8 --

## (undated) DEVICE — BANDAGE,GAUZE,CONFORMING,2"X75",STRL,LF: Brand: MEDLINE INDUSTRIES, INC.

## (undated) DEVICE — PREP SKN CHLRAPRP APL 26ML STR --

## (undated) DEVICE — PADDING CAST W4INXL4YD ST COT COHESIVE HND TEARABLE SPEC

## (undated) DEVICE — BUTTON SWITCH PENCIL BLADE ELECTRODE, HOLSTER: Brand: EDGE

## (undated) DEVICE — Device

## (undated) DEVICE — 3M™ COBAN™ NL STERILE NON-LATEX SELF-ADHERENT WRAP, 2082S, 2 IN X 5 YD (5 CM X 4,5 M), 36 ROLLS/CASE: Brand: 3M™ COBAN™

## (undated) DEVICE — FOOT & ANKLE SOFT DR WOMACK: Brand: MEDLINE INDUSTRIES, INC.

## (undated) DEVICE — BNDG,ELSTC,MATRIX,STRL,4"X5YD,LF,HOOK&LP: Brand: MEDLINE